# Patient Record
Sex: FEMALE | Race: WHITE | Employment: UNEMPLOYED | URBAN - METROPOLITAN AREA
[De-identification: names, ages, dates, MRNs, and addresses within clinical notes are randomized per-mention and may not be internally consistent; named-entity substitution may affect disease eponyms.]

---

## 2023-12-06 ENCOUNTER — APPOINTMENT (OUTPATIENT)
Dept: GENERAL RADIOLOGY | Age: 33
End: 2023-12-06
Payer: MEDICAID

## 2023-12-06 ENCOUNTER — HOSPITAL ENCOUNTER (INPATIENT)
Age: 33
LOS: 2 days | Discharge: LEFT BEFORE DISCHARGE | End: 2023-12-08
Attending: EMERGENCY MEDICINE | Admitting: HOSPITALIST
Payer: MEDICAID

## 2023-12-06 ENCOUNTER — APPOINTMENT (OUTPATIENT)
Dept: CT IMAGING | Age: 33
End: 2023-12-06
Payer: MEDICAID

## 2023-12-06 ENCOUNTER — APPOINTMENT (OUTPATIENT)
Dept: CT IMAGING | Age: 33
End: 2023-12-06
Attending: EMERGENCY MEDICINE
Payer: MEDICAID

## 2023-12-06 DIAGNOSIS — S71.101A COMPLICATED OPEN WOUND OF RIGHT THIGH, INITIAL ENCOUNTER: Primary | ICD-10-CM

## 2023-12-06 DIAGNOSIS — L03.116 LEFT LEG CELLULITIS: ICD-10-CM

## 2023-12-06 PROBLEM — L03.90 CELLULITIS: Status: ACTIVE | Noted: 2023-12-06

## 2023-12-06 LAB
ALBUMIN SERPL-MCNC: 4 G/DL (ref 3.4–5)
ALBUMIN/GLOB SERPL: 1.3 {RATIO} (ref 1.1–2.2)
ALP SERPL-CCNC: 65 U/L (ref 40–129)
ALT SERPL-CCNC: 10 U/L (ref 10–40)
ANION GAP SERPL CALCULATED.3IONS-SCNC: 10 MMOL/L (ref 3–16)
AST SERPL-CCNC: 13 U/L (ref 15–37)
BASE EXCESS BLDV CALC-SCNC: 4.5 MMOL/L
BASOPHILS # BLD: 0 K/UL (ref 0–0.2)
BASOPHILS NFR BLD: 1 %
BILIRUB SERPL-MCNC: 0.4 MG/DL (ref 0–1)
BUN SERPL-MCNC: 12 MG/DL (ref 7–20)
CALCIUM SERPL-MCNC: 9.2 MG/DL (ref 8.3–10.6)
CHLORIDE SERPL-SCNC: 100 MMOL/L (ref 99–110)
CO2 BLDV-SCNC: 29 MMOL/L
CO2 SERPL-SCNC: 25 MMOL/L (ref 21–32)
COHGB MFR BLDV: 7.2 %
CREAT SERPL-MCNC: 0.7 MG/DL (ref 0.6–1.1)
CRP SERPL-MCNC: 19 MG/L (ref 0–5.1)
D DIMER: 1.87 UG/ML FEU (ref 0–0.6)
DEPRECATED RDW RBC AUTO: 14.1 % (ref 12.4–15.4)
EOSINOPHIL # BLD: 0.1 K/UL (ref 0–0.6)
EOSINOPHIL NFR BLD: 2.7 %
ERYTHROCYTE [SEDIMENTATION RATE] IN BLOOD BY WESTERGREN METHOD: 19 MM/HR (ref 0–20)
FLUAV RNA UPPER RESP QL NAA+PROBE: NEGATIVE
FLUBV AG NPH QL: NEGATIVE
GFR SERPLBLD CREATININE-BSD FMLA CKD-EPI: >60 ML/MIN/{1.73_M2}
GLUCOSE SERPL-MCNC: 85 MG/DL (ref 70–99)
HCG SERPL QL: NEGATIVE
HCO3 BLDV-SCNC: 28 MMOL/L (ref 23–29)
HCT VFR BLD AUTO: 34.3 % (ref 36–48)
HGB BLD-MCNC: 11.7 G/DL (ref 12–16)
LACTATE BLDV-SCNC: 1 MMOL/L (ref 0.4–1.9)
LYMPHOCYTES # BLD: 1.6 K/UL (ref 1–5.1)
LYMPHOCYTES NFR BLD: 32.7 %
MCH RBC QN AUTO: 27.7 PG (ref 26–34)
MCHC RBC AUTO-ENTMCNC: 34.3 G/DL (ref 31–36)
MCV RBC AUTO: 80.7 FL (ref 80–100)
METHGB MFR BLDV: 0.2 %
MONOCYTES # BLD: 0.7 K/UL (ref 0–1.3)
MONOCYTES NFR BLD: 14.7 %
NEUTROPHILS # BLD: 2.4 K/UL (ref 1.7–7.7)
NEUTROPHILS NFR BLD: 48.9 %
O2 THERAPY: ABNORMAL
PCO2 BLDV: 36.9 MMHG (ref 40–50)
PH BLDV: 7.49 [PH] (ref 7.35–7.45)
PLATELET # BLD AUTO: 290 K/UL (ref 135–450)
PMV BLD AUTO: 8 FL (ref 5–10.5)
PO2 BLDV: 37 MMHG
POTASSIUM SERPL-SCNC: 3.9 MMOL/L (ref 3.5–5.1)
PROT SERPL-MCNC: 7.1 G/DL (ref 6.4–8.2)
RBC # BLD AUTO: 4.25 M/UL (ref 4–5.2)
REASON FOR REJECTION: NORMAL
REJECTED TEST: NORMAL
SAO2 % BLDV: 78 %
SARS-COV-2 RDRP RESP QL NAA+PROBE: NOT DETECTED
SODIUM SERPL-SCNC: 135 MMOL/L (ref 136–145)
WBC # BLD AUTO: 4.9 K/UL (ref 4–11)

## 2023-12-06 PROCEDURE — 73700 CT LOWER EXTREMITY W/O DYE: CPT

## 2023-12-06 PROCEDURE — 96365 THER/PROPH/DIAG IV INF INIT: CPT

## 2023-12-06 PROCEDURE — 85025 COMPLETE CBC W/AUTO DIFF WBC: CPT

## 2023-12-06 PROCEDURE — 96367 TX/PROPH/DG ADDL SEQ IV INF: CPT

## 2023-12-06 PROCEDURE — C1751 CATH, INF, PER/CENT/MIDLINE: HCPCS

## 2023-12-06 PROCEDURE — 6360000002 HC RX W HCPCS: Performed by: EMERGENCY MEDICINE

## 2023-12-06 PROCEDURE — 73590 X-RAY EXAM OF LOWER LEG: CPT

## 2023-12-06 PROCEDURE — 84703 CHORIONIC GONADOTROPIN ASSAY: CPT

## 2023-12-06 PROCEDURE — 6370000000 HC RX 637 (ALT 250 FOR IP): Performed by: HOSPITALIST

## 2023-12-06 PROCEDURE — 2580000003 HC RX 258: Performed by: EMERGENCY MEDICINE

## 2023-12-06 PROCEDURE — 85379 FIBRIN DEGRADATION QUANT: CPT

## 2023-12-06 PROCEDURE — 2580000003 HC RX 258: Performed by: HOSPITALIST

## 2023-12-06 PROCEDURE — 80053 COMPREHEN METABOLIC PANEL: CPT

## 2023-12-06 PROCEDURE — 36569 INSJ PICC 5 YR+ W/O IMAGING: CPT

## 2023-12-06 PROCEDURE — 96374 THER/PROPH/DIAG INJ IV PUSH: CPT

## 2023-12-06 PROCEDURE — 6370000000 HC RX 637 (ALT 250 FOR IP): Performed by: EMERGENCY MEDICINE

## 2023-12-06 PROCEDURE — 87040 BLOOD CULTURE FOR BACTERIA: CPT

## 2023-12-06 PROCEDURE — 1200000000 HC SEMI PRIVATE

## 2023-12-06 PROCEDURE — 73552 X-RAY EXAM OF FEMUR 2/>: CPT

## 2023-12-06 PROCEDURE — 6360000002 HC RX W HCPCS: Performed by: HOSPITALIST

## 2023-12-06 PROCEDURE — 87804 INFLUENZA ASSAY W/OPTIC: CPT

## 2023-12-06 PROCEDURE — 2500000003 HC RX 250 WO HCPCS: Performed by: EMERGENCY MEDICINE

## 2023-12-06 PROCEDURE — 71045 X-RAY EXAM CHEST 1 VIEW: CPT

## 2023-12-06 PROCEDURE — 96375 TX/PRO/DX INJ NEW DRUG ADDON: CPT

## 2023-12-06 PROCEDURE — 87635 SARS-COV-2 COVID-19 AMP PRB: CPT

## 2023-12-06 PROCEDURE — 83605 ASSAY OF LACTIC ACID: CPT

## 2023-12-06 PROCEDURE — 86140 C-REACTIVE PROTEIN: CPT

## 2023-12-06 PROCEDURE — 02HV33Z INSERTION OF INFUSION DEVICE INTO SUPERIOR VENA CAVA, PERCUTANEOUS APPROACH: ICD-10-PCS | Performed by: HOSPITALIST

## 2023-12-06 PROCEDURE — 85652 RBC SED RATE AUTOMATED: CPT

## 2023-12-06 PROCEDURE — 96366 THER/PROPH/DIAG IV INF ADDON: CPT

## 2023-12-06 PROCEDURE — 99285 EMERGENCY DEPT VISIT HI MDM: CPT

## 2023-12-06 PROCEDURE — 82803 BLOOD GASES ANY COMBINATION: CPT

## 2023-12-06 PROCEDURE — 73630 X-RAY EXAM OF FOOT: CPT

## 2023-12-06 RX ORDER — POTASSIUM CHLORIDE 20 MEQ/1
40 TABLET, EXTENDED RELEASE ORAL PRN
Status: DISCONTINUED | OUTPATIENT
Start: 2023-12-06 | End: 2023-12-08 | Stop reason: HOSPADM

## 2023-12-06 RX ORDER — SODIUM CHLORIDE 9 MG/ML
INJECTION, SOLUTION INTRAVENOUS PRN
Status: DISCONTINUED | OUTPATIENT
Start: 2023-12-06 | End: 2023-12-08 | Stop reason: HOSPADM

## 2023-12-06 RX ORDER — SODIUM CHLORIDE 0.9 % (FLUSH) 0.9 %
5-40 SYRINGE (ML) INJECTION EVERY 12 HOURS SCHEDULED
Status: DISCONTINUED | OUTPATIENT
Start: 2023-12-06 | End: 2023-12-08 | Stop reason: HOSPADM

## 2023-12-06 RX ORDER — MORPHINE SULFATE 4 MG/ML
4 INJECTION, SOLUTION INTRAMUSCULAR; INTRAVENOUS ONCE
Status: COMPLETED | OUTPATIENT
Start: 2023-12-06 | End: 2023-12-06

## 2023-12-06 RX ORDER — ONDANSETRON 4 MG/1
4 TABLET, ORALLY DISINTEGRATING ORAL EVERY 8 HOURS PRN
Status: DISCONTINUED | OUTPATIENT
Start: 2023-12-06 | End: 2023-12-08 | Stop reason: HOSPADM

## 2023-12-06 RX ORDER — SODIUM CHLORIDE 0.9 % (FLUSH) 0.9 %
5-40 SYRINGE (ML) INJECTION PRN
Status: DISCONTINUED | OUTPATIENT
Start: 2023-12-06 | End: 2023-12-08 | Stop reason: HOSPADM

## 2023-12-06 RX ORDER — ACETAMINOPHEN 650 MG/1
650 SUPPOSITORY RECTAL EVERY 6 HOURS PRN
Status: DISCONTINUED | OUTPATIENT
Start: 2023-12-06 | End: 2023-12-08 | Stop reason: HOSPADM

## 2023-12-06 RX ORDER — KETOROLAC TROMETHAMINE 15 MG/ML
15 INJECTION, SOLUTION INTRAMUSCULAR; INTRAVENOUS ONCE
Status: COMPLETED | OUTPATIENT
Start: 2023-12-06 | End: 2023-12-06

## 2023-12-06 RX ORDER — ENOXAPARIN SODIUM 100 MG/ML
40 INJECTION SUBCUTANEOUS DAILY
Status: DISCONTINUED | OUTPATIENT
Start: 2023-12-07 | End: 2023-12-08 | Stop reason: HOSPADM

## 2023-12-06 RX ORDER — SODIUM CHLORIDE 9 MG/ML
INJECTION, SOLUTION INTRAVENOUS CONTINUOUS
Status: DISCONTINUED | OUTPATIENT
Start: 2023-12-06 | End: 2023-12-07

## 2023-12-06 RX ORDER — LORAZEPAM 1 MG/1
1 TABLET ORAL ONCE
Status: COMPLETED | OUTPATIENT
Start: 2023-12-06 | End: 2023-12-06

## 2023-12-06 RX ORDER — ONDANSETRON 2 MG/ML
4 INJECTION INTRAMUSCULAR; INTRAVENOUS EVERY 6 HOURS PRN
Status: DISCONTINUED | OUTPATIENT
Start: 2023-12-06 | End: 2023-12-08 | Stop reason: HOSPADM

## 2023-12-06 RX ORDER — POLYETHYLENE GLYCOL 3350 17 G/17G
17 POWDER, FOR SOLUTION ORAL DAILY PRN
Status: DISCONTINUED | OUTPATIENT
Start: 2023-12-06 | End: 2023-12-08 | Stop reason: HOSPADM

## 2023-12-06 RX ORDER — SODIUM CHLORIDE 9 MG/ML
25 INJECTION, SOLUTION INTRAVENOUS PRN
Status: DISCONTINUED | OUTPATIENT
Start: 2023-12-06 | End: 2023-12-06

## 2023-12-06 RX ORDER — LIDOCAINE HYDROCHLORIDE 10 MG/ML
5 INJECTION, SOLUTION EPIDURAL; INFILTRATION; INTRACAUDAL; PERINEURAL ONCE
Status: COMPLETED | OUTPATIENT
Start: 2023-12-06 | End: 2023-12-06

## 2023-12-06 RX ORDER — LORAZEPAM 2 MG/ML
1 INJECTION INTRAMUSCULAR ONCE
Status: COMPLETED | OUTPATIENT
Start: 2023-12-06 | End: 2023-12-06

## 2023-12-06 RX ORDER — VANCOMYCIN 2 G/400ML
20 INJECTION, SOLUTION INTRAVENOUS ONCE
Status: COMPLETED | OUTPATIENT
Start: 2023-12-06 | End: 2023-12-06

## 2023-12-06 RX ORDER — ACETAMINOPHEN 325 MG/1
650 TABLET ORAL EVERY 6 HOURS PRN
Status: DISCONTINUED | OUTPATIENT
Start: 2023-12-06 | End: 2023-12-08 | Stop reason: HOSPADM

## 2023-12-06 RX ORDER — SODIUM CHLORIDE 0.9 % (FLUSH) 0.9 %
10 SYRINGE (ML) INJECTION PRN
Status: DISCONTINUED | OUTPATIENT
Start: 2023-12-06 | End: 2023-12-08 | Stop reason: HOSPADM

## 2023-12-06 RX ORDER — POTASSIUM CHLORIDE 7.45 MG/ML
10 INJECTION INTRAVENOUS PRN
Status: DISCONTINUED | OUTPATIENT
Start: 2023-12-06 | End: 2023-12-08 | Stop reason: HOSPADM

## 2023-12-06 RX ORDER — OXYCODONE HYDROCHLORIDE AND ACETAMINOPHEN 5; 325 MG/1; MG/1
1 TABLET ORAL EVERY 4 HOURS PRN
Status: DISCONTINUED | OUTPATIENT
Start: 2023-12-06 | End: 2023-12-07

## 2023-12-06 RX ADMIN — Medication 10 ML: at 19:53

## 2023-12-06 RX ADMIN — SODIUM CHLORIDE: 9 INJECTION, SOLUTION INTRAVENOUS at 19:52

## 2023-12-06 RX ADMIN — LIDOCAINE HYDROCHLORIDE 5 ML: 10 INJECTION, SOLUTION EPIDURAL; INFILTRATION; INTRACAUDAL; PERINEURAL at 16:30

## 2023-12-06 RX ADMIN — KETOROLAC TROMETHAMINE 15 MG: 15 INJECTION, SOLUTION INTRAMUSCULAR; INTRAVENOUS at 06:45

## 2023-12-06 RX ADMIN — OXYCODONE AND ACETAMINOPHEN 1 TABLET: 5; 325 TABLET ORAL at 23:59

## 2023-12-06 RX ADMIN — Medication 10 ML: at 19:52

## 2023-12-06 RX ADMIN — MORPHINE SULFATE 4 MG: 4 INJECTION, SOLUTION INTRAMUSCULAR; INTRAVENOUS at 09:37

## 2023-12-06 RX ADMIN — LORAZEPAM 1 MG: 1 TABLET ORAL at 14:01

## 2023-12-06 RX ADMIN — VANCOMYCIN 2000 MG: 2 INJECTION, SOLUTION INTRAVENOUS at 08:26

## 2023-12-06 RX ADMIN — LORAZEPAM 1 MG: 1 TABLET ORAL at 11:36

## 2023-12-06 RX ADMIN — Medication 1 MG: at 08:21

## 2023-12-06 RX ADMIN — OXYCODONE AND ACETAMINOPHEN 1 TABLET: 5; 325 TABLET ORAL at 13:04

## 2023-12-06 RX ADMIN — VANCOMYCIN HYDROCHLORIDE 1750 MG: 1 INJECTION, POWDER, LYOPHILIZED, FOR SOLUTION INTRAVENOUS at 23:53

## 2023-12-06 RX ADMIN — CEFEPIME 2000 MG: 2 INJECTION, POWDER, FOR SOLUTION INTRAVENOUS at 07:22

## 2023-12-06 RX ADMIN — OXYCODONE HYDROCHLORIDE 15 MG: 10 TABLET ORAL at 21:06

## 2023-12-06 ASSESSMENT — PAIN DESCRIPTION - LOCATION
LOCATION: LEG
LOCATION: LEG

## 2023-12-06 ASSESSMENT — PAIN - FUNCTIONAL ASSESSMENT: PAIN_FUNCTIONAL_ASSESSMENT: 0-10

## 2023-12-06 ASSESSMENT — PAIN DESCRIPTION - ORIENTATION
ORIENTATION: LEFT
ORIENTATION: LEFT;RIGHT

## 2023-12-06 ASSESSMENT — PAIN SCALES - GENERAL
PAINLEVEL_OUTOF10: 7
PAINLEVEL_OUTOF10: 7
PAINLEVEL_OUTOF10: 8

## 2023-12-06 ASSESSMENT — PAIN DESCRIPTION - DESCRIPTORS
DESCRIPTORS: ACHING
DESCRIPTORS: ACHING

## 2023-12-06 NOTE — PROGRESS NOTES
Patient very anxious at this time. VSS, but will not keep blood pressure cuff and pulse ox on. Dry gauze dressing placed over patients thigh abscess.

## 2023-12-06 NOTE — ED TRIAGE NOTES
Pt presents for abscesses secondary to injecting opiates. Pt is very tearful and expresses she has medical trauma.

## 2023-12-06 NOTE — PROGRESS NOTES
Call from PICC rn. They are walking into hospital at this time. One time ativan given to patient prior to procedure.

## 2023-12-06 NOTE — ED PROVIDER NOTES
[facility]    This patient was initially evaluated by Dr Makayla Cabrera. Briefly, 34 y.o. female with PMH lupus, IVDU presented with concern for soft tissue infection LLE. At the time of signout, the following was pending:    - f/u labs, CT, disposition      On my reassessment, the patient is anxious in appearance. She has multiple skin lesions - most notably anterior aspect LLL with central focus of necrosis, surrounding erythema, no crepitus, no fluctuance, no lymphangitic streaking. CBC no leukocytosis. CRP elevated. Already rec'd broad spectrum abx. Lactate WNL. IV access on this patient had proven difficult prior to my arrival.  She had an EJ placed by the off going physician however was noted to have extravasated. I evaluated the patient for an ultrasound-guided peripheral IV. She is very tortuous, deep vessels. I did offer to place a peripheral IJ however the patient is refusing. I offered other central venous access, the patient continues to refuse. She does wish for me to proceed with placement of an ultrasound-guided peripheral IV. A 20 gauge long PIV was placed in the right upper extremity as documented below without complication. The patient was very hesitant for admission to the hospital.  She has a history of PTSD and multiple hospitalizations. She states she has been traumatized by the experiences she has had previously. She does, however, ultimately consents to be admitted to the hospital.  Case d/w Dr Christi Prieto. Admit to . D-dimer pending at time of admission -- lab indicates issues with the D-dimer assay. Hospitalist aware of pending lab. Lore Neff MD, am the primary clinician of record. PROCEDURES  Ultrasound guided peripheral IV  Indication: difficult IV access  Verbal consent given by the patient.  Risks discussed including but not limited to failure to achieve the desired result, need for additional procedure, damage to surrounding

## 2023-12-06 NOTE — PROGRESS NOTES
Patient in bed, VSS on room air. Patient asleep at this time, but easily arouses. Denies any needs at this time.

## 2023-12-06 NOTE — PROGRESS NOTES
Arrived to place PICC line with bedside RN Valerio Archer. Pre-procedure and timeout done with RN, discussed limitations of placement and allergies. Pt's basilic, brachial, cephalic are all easily collapsible with no indication for a clot. Vein selected is large enough for catheter. Pt tolerated sterile procedure well, with no difficulty accessing basilic vein, when accessed - blood was free flowing and non-pulsatile. Guidewire, introducer, and catheter went in smoothly. PICC line verified with 3CG technology with peaked P-waves (please see image below). PICC tip terminates in the SVC according to [employer] 3CG tip confirmation system. PICC was seen dropping into SVC with tip tracking technology and discernable peaked p waves were noted without negative deflection. OK to use PICC. Please use new IV tubing when connecting to the newly placed central line. Post procedure - reorganized pt table, placed pt in lowest position, with call light and educated on line care. Reported off to bedside RN. Please call if you have any questions about the PICC or ML. The  will direct you to the PICC RN that is on call.       [phone]

## 2023-12-06 NOTE — ED PROVIDER NOTES
[address] [facility] EMERGENCY DEPARTMENT    CHIEF COMPLAINT  Abscess (Abscesses from IV drug use )       HISTORY OF PRESENT ILLNESS  Carlton Howard is a 34 y.o. female w/ hx of lupus, IVDU who presents to the ED with multiple abscesses. She has an abscess on her right thigh that been present for about 1 month. It has been spontaneously draining purulent fluid and she has an open wound there. Has multiple small abscesses on her left shin and was concerned that she got bit by a spider today but did not see the spider. She developed a rapidly spreading redness and swelling of her right foot and leg today. States she has chronic fevers from her lupus. Is not currently on any immune suppression. States she has had cough and congestion for several days along with several family members and that her wife has not tested positive for COVID. Denies chest pain or shortness of breath. I have reviewed the following from the nursing documentation:    Past Medical History:   Diagnosis Date    Blindness of right eye     Lupus ([address])     Sacrum and coccyx fracture ([address])     Seizure ([address]) 05/08/2021    secondary to TBI    TBI (traumatic brain injury) [facility])      Past Surgical History:   Procedure Laterality Date    EXPLORATORY LAPAROTOMY W/ BOWEL RESECTION      EYE SURGERY      \"I've had like 18 surgeries on my eye\"    LUMBAR DISCECTOMY      PANCREAS SURGERY      \"something with my pancreas\"     History reviewed. No pertinent family history.   Social History     Socioeconomic History    Marital status:      Spouse name: Not on file    Number of children: Not on file    Years of education: Not on file    Highest education level: Not on file   Occupational History    Not on file   Tobacco Use    Smoking status: Every Day     Packs/day: 1.00     Years: 20.00     Additional pack years: 0.00     Total pack years: 20.00     Types: Cigarettes    Smokeless tobacco: Never   Substance and Sexual Activity    Alcohol use: marks on all 4 extremities  Neurological: Alert and oriented. CN II-XII intact. Strength 5/5 bilateral upper and lower extremities. Sensation intact to light touch. Psych: Anxious    LABS  I have reviewed all labs for this visit. Results for orders placed or performed during the hospital encounter of 12/06/23   CBC with Auto Differential   Result Value Ref Range    WBC 4.9 4.0 - 11.0 K/uL    RBC 4.25 4.00 - 5.20 M/uL    Hemoglobin 11.7 (L) 12.0 - 16.0 g/dL    Hematocrit 34.3 (L) 36.0 - 48.0 %    MCV 80.7 80.0 - 100.0 fL    MCH 27.7 26.0 - 34.0 pg    MCHC 34.3 31.0 - 36.0 g/dL    RDW 14.1 12.4 - 15.4 %    Platelets 832 822 - 331 K/uL    MPV 8.0 5.0 - 10.5 fL    Neutrophils % 48.9 %    Lymphocytes % 32.7 %    Monocytes % 14.7 %    Eosinophils % 2.7 %    Basophils % 1.0 %    Neutrophils Absolute 2.4 1.7 - 7.7 K/uL    Lymphocytes Absolute 1.6 1.0 - 5.1 K/uL    Monocytes Absolute 0.7 0.0 - 1.3 K/uL    Eosinophils Absolute 0.1 0.0 - 0.6 K/uL    Basophils Absolute 0.0 0.0 - 0.2 K/uL         RADIOLOGY  I have reviewed all radiographic studies for this visit. XR FOOT LEFT (MIN 3 VIEWS)   Final Result   Soft tissue swelling, though without acute osseous fracture. No radiopaque foreign body or definite soft tissue gas. XR TIBIA FIBULA LEFT (2 VIEWS)   Final Result   Right femur:      1. No acute osseous abnormality. 2. Soft tissue swelling seen in the thigh. 3. No radiopaque foreign body. ------------------      Left tib-fib:      1. Small degree of soft tissue gas seen in the lateral aspect of the mid calf. 2. No foreign body. 3. No acute osseous abnormality. XR FEMUR RIGHT (MIN 2 VIEWS)   Final Result   Right femur:      1. No acute osseous abnormality. 2. Soft tissue swelling seen in the thigh. 3. No radiopaque foreign body. ------------------      Left tib-fib:      1. Small degree of soft tissue gas seen in the lateral aspect of the mid calf. 2. No foreign body.    3. No acute

## 2023-12-06 NOTE — PROGRESS NOTES
Patient asking for pain medication. Patient has PRN percocet q4h, but is not yet due for another dose. Dr. Carlos Manuel Santo regarding patients request for another pain medication. See new orders placed.

## 2023-12-07 LAB
BASOPHILS # BLD: 0 K/UL (ref 0–0.2)
BASOPHILS NFR BLD: 0.9 %
DEPRECATED RDW RBC AUTO: 14.2 % (ref 12.4–15.4)
EOSINOPHIL # BLD: 0.3 K/UL (ref 0–0.6)
EOSINOPHIL NFR BLD: 4.9 %
HCT VFR BLD AUTO: 32.1 % (ref 36–48)
HGB BLD-MCNC: 10.9 G/DL (ref 12–16)
LYMPHOCYTES # BLD: 1.7 K/UL (ref 1–5.1)
LYMPHOCYTES NFR BLD: 32.6 %
MCH RBC QN AUTO: 27.1 PG (ref 26–34)
MCHC RBC AUTO-ENTMCNC: 34.1 G/DL (ref 31–36)
MCV RBC AUTO: 79.5 FL (ref 80–100)
MONOCYTES # BLD: 0.8 K/UL (ref 0–1.3)
MONOCYTES NFR BLD: 14.5 %
MRSA DNA SPEC QL NAA+PROBE: NORMAL
NEUTROPHILS # BLD: 2.5 K/UL (ref 1.7–7.7)
NEUTROPHILS NFR BLD: 47.1 %
PLATELET # BLD AUTO: 260 K/UL (ref 135–450)
PMV BLD AUTO: 7.8 FL (ref 5–10.5)
PROCALCITONIN SERPL IA-MCNC: 0.09 NG/ML (ref 0–0.15)
RBC # BLD AUTO: 4.03 M/UL (ref 4–5.2)
VANCOMYCIN SERPL-MCNC: 19.8 UG/ML
WBC # BLD AUTO: 5.3 K/UL (ref 4–11)

## 2023-12-07 PROCEDURE — 2580000003 HC RX 258: Performed by: HOSPITALIST

## 2023-12-07 PROCEDURE — 6360000002 HC RX W HCPCS: Performed by: HOSPITALIST

## 2023-12-07 PROCEDURE — 6370000000 HC RX 637 (ALT 250 FOR IP): Performed by: HOSPITALIST

## 2023-12-07 PROCEDURE — 84145 PROCALCITONIN (PCT): CPT

## 2023-12-07 PROCEDURE — 85025 COMPLETE CBC W/AUTO DIFF WBC: CPT

## 2023-12-07 PROCEDURE — 2580000003 HC RX 258: Performed by: INTERNAL MEDICINE

## 2023-12-07 PROCEDURE — 1200000000 HC SEMI PRIVATE

## 2023-12-07 PROCEDURE — 87641 MR-STAPH DNA AMP PROBE: CPT

## 2023-12-07 PROCEDURE — 6360000002 HC RX W HCPCS: Performed by: INTERNAL MEDICINE

## 2023-12-07 PROCEDURE — 80202 ASSAY OF VANCOMYCIN: CPT

## 2023-12-07 RX ORDER — SODIUM CHLORIDE 9 MG/ML
INJECTION, SOLUTION INTRAVENOUS CONTINUOUS
Status: ACTIVE | OUTPATIENT
Start: 2023-12-07 | End: 2023-12-07

## 2023-12-07 RX ORDER — OXYCODONE HYDROCHLORIDE AND ACETAMINOPHEN 5; 325 MG/1; MG/1
1 TABLET ORAL EVERY 4 HOURS PRN
Status: DISCONTINUED | OUTPATIENT
Start: 2023-12-07 | End: 2023-12-08 | Stop reason: HOSPADM

## 2023-12-07 RX ORDER — HEPARIN SODIUM (PORCINE) LOCK FLUSH IV SOLN 100 UNIT/ML 100 UNIT/ML
100 SOLUTION INTRAVENOUS PRN
Status: DISCONTINUED | OUTPATIENT
Start: 2023-12-07 | End: 2023-12-07

## 2023-12-07 RX ORDER — SODIUM CHLORIDE 0.9 % (FLUSH) 0.9 %
5-40 SYRINGE (ML) INJECTION EVERY 6 HOURS PRN
Status: DISCONTINUED | OUTPATIENT
Start: 2023-12-07 | End: 2023-12-08 | Stop reason: HOSPADM

## 2023-12-07 RX ORDER — HEPARIN SODIUM (PORCINE) LOCK FLUSH IV SOLN 100 UNIT/ML 100 UNIT/ML
100 SOLUTION INTRAVENOUS 2 TIMES DAILY
Status: DISCONTINUED | OUTPATIENT
Start: 2023-12-07 | End: 2023-12-08 | Stop reason: HOSPADM

## 2023-12-07 RX ADMIN — OXYCODONE HYDROCHLORIDE 15 MG: 10 TABLET ORAL at 17:25

## 2023-12-07 RX ADMIN — CEFEPIME 2000 MG: 2 INJECTION, POWDER, FOR SOLUTION INTRAVENOUS at 21:38

## 2023-12-07 RX ADMIN — Medication 10 ML: at 08:47

## 2023-12-07 RX ADMIN — OXYCODONE HYDROCHLORIDE 15 MG: 10 TABLET ORAL at 05:48

## 2023-12-07 RX ADMIN — OXYCODONE HYDROCHLORIDE 15 MG: 10 TABLET ORAL at 21:38

## 2023-12-07 RX ADMIN — CEFEPIME 2000 MG: 2 INJECTION, POWDER, FOR SOLUTION INTRAVENOUS at 08:47

## 2023-12-07 RX ADMIN — OXYCODONE HYDROCHLORIDE 15 MG: 10 TABLET ORAL at 12:29

## 2023-12-07 RX ADMIN — Medication 100 UNITS: at 13:27

## 2023-12-07 RX ADMIN — VANCOMYCIN HYDROCHLORIDE 1750 MG: 1 INJECTION, POWDER, LYOPHILIZED, FOR SOLUTION INTRAVENOUS at 13:31

## 2023-12-07 RX ADMIN — ONDANSETRON 4 MG: 2 INJECTION INTRAMUSCULAR; INTRAVENOUS at 12:40

## 2023-12-07 RX ADMIN — SODIUM CHLORIDE: 9 INJECTION, SOLUTION INTRAVENOUS at 15:32

## 2023-12-07 ASSESSMENT — PAIN DESCRIPTION - DESCRIPTORS
DESCRIPTORS: ACHING

## 2023-12-07 ASSESSMENT — PAIN SCALES - GENERAL
PAINLEVEL_OUTOF10: 10
PAINLEVEL_OUTOF10: 8
PAINLEVEL_OUTOF10: 10
PAINLEVEL_OUTOF10: 9

## 2023-12-07 ASSESSMENT — PAIN DESCRIPTION - PAIN TYPE
TYPE: ACUTE PAIN
TYPE: ACUTE PAIN

## 2023-12-07 ASSESSMENT — PAIN DESCRIPTION - LOCATION
LOCATION: GENERALIZED
LOCATION: LEG
LOCATION: LEG
LOCATION: GENERALIZED

## 2023-12-07 ASSESSMENT — PAIN DESCRIPTION - FREQUENCY
FREQUENCY: CONTINUOUS
FREQUENCY: CONTINUOUS

## 2023-12-07 ASSESSMENT — PAIN DESCRIPTION - ONSET
ONSET: ON-GOING
ONSET: ON-GOING

## 2023-12-07 ASSESSMENT — PAIN DESCRIPTION - ORIENTATION
ORIENTATION: LEFT;RIGHT
ORIENTATION: LEFT;RIGHT

## 2023-12-07 ASSESSMENT — PAIN - FUNCTIONAL ASSESSMENT: PAIN_FUNCTIONAL_ASSESSMENT: ACTIVITIES ARE NOT PREVENTED

## 2023-12-07 NOTE — PROGRESS NOTES
Physical Therapy  PT courtney Olmedo    Received orders for PT eval and treat. Spoke with pt and she denies any PT needs. Reports she has been getting up to bathroom without assist.  Will sign off.     Electronically signed by Tucker Starkey, PT [id number] on 12/7/2023 at 8:48 AM

## 2023-12-07 NOTE — PROGRESS NOTES
Occupational Therapy  No Eval and Discharge    Sharlene Jimmy  12/7/2023    OT orders received and chart reviewed. OT attempted to see for eval/tx. Pt reports she is functioning at baseline, and denies need for OT services. Pt reports UAL in room completing ADLs and fxl mobility independently. No acute OT services indicated, will discharge.      Chilango Clarke, OTR/L [id number]

## 2023-12-07 NOTE — PROGRESS NOTES
4 Eyes Skin Assessment     NAME:  Diaz Gonzalez OF BIRTH:  1990  MEDICAL RECORD NUMBER:  [id number]    The patient is being assessed for  Admission    I agree that at least one RN has performed a thorough Head to Toe Skin Assessment on the patient. ALL assessment sites listed below have been assessed. Areas assessed by both nurses:    Head, Face, Ears, Shoulders, Back, Chest, Arms, Elbows, Hands, Sacrum. Buttock, Coccyx, Ischium, Legs. Feet and Heels, and Under Medical Devices         Does the Patient have a Wound?  yes       Denzel Prevention initiated by RN: No  Wound Care Orders initiated by RN: No    Pressure Injury (Stage 3,4, Unstageable, DTI, NWPT, and Complex wounds) if present, place Wound referral order by RN under : No    New Ostomies, if present place, Ostomy referral order under : No     Nurse 1 eSignature: Electronically signed by Mariela Faria RN on 12/7/23 at 4:23 AM EST    **SHARE this note so that the co-signing nurse can place an eSignature**    Nurse 2 eSignature: Electronically signed by Himanshu King RN on 12/7/23 at 4:33 AM EST

## 2023-12-07 NOTE — PROGRESS NOTES
Pt transfer from the ED to 4118. A&oX4. VSS. Open wound noted on right thigh. Wound clean and dressing change. Patient c/o of pain. PRN pain medication given. Patient oriented to room. All questions answered.  Bed in the lowest position and call light within reach

## 2023-12-07 NOTE — PLAN OF CARE
Problem: Safety - Adult  Goal: Free from fall injury  12/7/2023 1711 by Boni Flowers RN  Outcome: Progressing  12/7/2023 0353 by Irma Rayo RN  Outcome: Progressing     Problem: Discharge Planning  Goal: Discharge to home or other facility with appropriate resources  12/7/2023 1711 by Boni Flowers RN  Outcome: Progressing  12/7/2023 0353 by Irma Rayo RN  Outcome: Progressing     Problem: Pain  Goal: Verbalizes/displays adequate comfort level or baseline comfort level  12/7/2023 1711 by Boni Flowers RN  Outcome: Progressing  12/7/2023 0353 by Irma Rayo RN  Outcome: Progressing     Problem: Skin/Tissue Integrity - Adult  Goal: Incisions, wounds, or drain sites healing without S/S of infection  12/7/2023 1711 by Boni Flowers RN  Outcome: Progressing  12/7/2023 0353 by Irma Rayo RN  Outcome: Progressing     Problem: Infection - Adult  Goal: Absence of infection at discharge  12/7/2023 1711 by Boni Flowers RN  Outcome: Progressing  12/7/2023 0353 by Irma Rayo RN  Outcome: Progressing  Goal: Absence of infection during hospitalization  12/7/2023 1711 by Boni Flowers RN  Outcome: Progressing  12/7/2023 0353 by Irma Rayo RN  Outcome: Progressing

## 2023-12-07 NOTE — CARE COORDINATION
the discharge plan with any other family members/significant others, and if so, who? (P) Yes (Can talk with spouse as needed.)  Plans to Return to Present Housing: (P) Yes  Other Identified Issues/Barriers to RETURNING to current housing: Pt would not be eligible for IV abx at home d/t IV drug abuse. Would need SNF. Potential Assistance needed at discharge: (P) N/A (Pt is refusing all assistance at this time. SW talked with pt about prescription assistance as she has [place]. Pt declined needing asssitance.)            Potential DME:  None  Patient expects to discharge to: (P) Apartment  Plan for transportation at discharge: (P) Family (Spouse will transport pt home at d/c.)    Financial    Payor: [address] / Plan: [email or web address] / Product Type: *No Product type* /     Does insurance require precert for SNF: Yes    Potential assistance Purchasing Medications: (P) No (Pt declined all assistance at this time.)  Meds-to-Beds request: Yes    No Pharmacies Listed    Notes:    Factors facilitating achievement of predicted outcomes: Motivated, Pleasant, and Good insight into deficits    Barriers to discharge: Pain, Limited family support, Anxiety, and history of IV drug abuse. Additional Case Management Notes: SW met with pt and pts spouse to discuss d/c plans. Pt resides in an apartment with her spouse and stated she had previously been going to a pain clinic when the clinic was shut down. Pt stated she is now addicted to opiates and uses intravenously. Pt stated she plans to f/u with [facility] with the hope to start on Methadone. Pt stated she does not want assistance with this process. Pt does not have a PCP and declined a PCP list stating she will take care of finding a PCP. SHARRON talked with pt about possible medication assistance as she has [place]. Pt stated she has applied for [facility] and does not need help with affording her medications.   SHARRON requested pt to reach out if she should change her mind and would be interested in resources or connections to support in the community. Pts spouse will transport pt home at d/c. PLAN: From home with wife. Hx of IV drug abuse. Declined resources or support. No PCP. Pt declined assistance with PCP arrangement or a PCP list.  Pt has [place] Medicaid and declined assistance with medications. Spouse will transport pt home. The Plan for Transition of Care is related to the following treatment goals of Cellulitis [L03.90]  Left leg cellulitis [O58.474]  Complicated open wound of right thigh, initial encounter [address]  Case Management Department  Ph: [phone] Fax: [phone]  Electronically signed by DALIA Marmolejo on 12/7/2023 at 3:57 PM       12/07/23 3429   Service Assessment   Patient Orientation Alert and Oriented;Person;Place;Situation;Self   Cognition Alert   History Provided By Patient   Primary [address] is: Legal Next of Kin   PCP Verified by CM No  (Pt stated she has no PCP but will take care of it. Declined need for PCP list.)   Prior Functional Level Independent in ADLs/IADLs   Current Functional Level Independent in ADLs/IADLs   Can patient return to prior living arrangement Yes   Ability to make needs known: Fair   Family able to assist with home care needs: Yes   Would you like for me to discuss the discharge plan with any other family members/significant others, and if so, who? Yes  (Can talk with spouse as needed.)   Financial Resources Medicaid  (Has applied for [facility])   [employer] None   CM/SW Referral Other (see comment)  (d/c planning needs)   Discharge Planning   Type of Residence Apartment   Living Arrangements Spouse/Significant Other   Current Services Prior To Admission None   Potential Assistance Needed N/A  (Pt is refusing all assistance at this time.

## 2023-12-07 NOTE — PROGRESS NOTES
Report called to 4th floor RN to resume care. Updated on patients chief complaint, reason for admission, events while being in ED, medications given and current vitals. All questions answered.

## 2023-12-07 NOTE — PLAN OF CARE
Problem: Safety - Adult  Goal: Free from fall injury  Outcome: Progressing     Problem: Discharge Planning  Goal: Discharge to home or other facility with appropriate resources  Outcome: Progressing     Problem: Pain  Goal: Verbalizes/displays adequate comfort level or baseline comfort level  Outcome: Progressing     Problem: Skin/Tissue Integrity - Adult  Goal: Incisions, wounds, or drain sites healing without S/S of infection  Outcome: Progressing     Problem: Infection - Adult  Goal: Absence of infection at discharge  Outcome: Progressing  Goal: Absence of infection during hospitalization  Outcome: Progressing

## 2023-12-08 VITALS
SYSTOLIC BLOOD PRESSURE: 128 MMHG | HEIGHT: 71 IN | BODY MASS INDEX: 35 KG/M2 | RESPIRATION RATE: 16 BRPM | TEMPERATURE: 98.6 F | DIASTOLIC BLOOD PRESSURE: 78 MMHG | HEART RATE: 93 BPM | WEIGHT: 250 LBS | OXYGEN SATURATION: 97 %

## 2023-12-08 LAB
ANION GAP SERPL CALCULATED.3IONS-SCNC: 6 MMOL/L (ref 3–16)
BUN SERPL-MCNC: 7 MG/DL (ref 7–20)
CALCIUM SERPL-MCNC: 8.1 MG/DL (ref 8.3–10.6)
CHLORIDE SERPL-SCNC: 109 MMOL/L (ref 99–110)
CO2 SERPL-SCNC: 25 MMOL/L (ref 21–32)
CREAT SERPL-MCNC: 0.6 MG/DL (ref 0.6–1.1)
CREAT SERPL-MCNC: 0.6 MG/DL (ref 0.6–1.1)
GFR SERPLBLD CREATININE-BSD FMLA CKD-EPI: >60 ML/MIN/{1.73_M2}
GFR SERPLBLD CREATININE-BSD FMLA CKD-EPI: >60 ML/MIN/{1.73_M2}
GLUCOSE SERPL-MCNC: 106 MG/DL (ref 70–99)
POTASSIUM SERPL-SCNC: 3.6 MMOL/L (ref 3.5–5.1)
SODIUM SERPL-SCNC: 140 MMOL/L (ref 136–145)
VANCOMYCIN TROUGH SERPL-MCNC: 30.6 UG/ML (ref 10–20)

## 2023-12-08 PROCEDURE — 6360000002 HC RX W HCPCS: Performed by: HOSPITALIST

## 2023-12-08 PROCEDURE — 6360000002 HC RX W HCPCS: Performed by: INTERNAL MEDICINE

## 2023-12-08 PROCEDURE — 2580000003 HC RX 258: Performed by: INTERNAL MEDICINE

## 2023-12-08 PROCEDURE — 2580000003 HC RX 258: Performed by: EMERGENCY MEDICINE

## 2023-12-08 PROCEDURE — 6370000000 HC RX 637 (ALT 250 FOR IP): Performed by: HOSPITALIST

## 2023-12-08 PROCEDURE — 80202 ASSAY OF VANCOMYCIN: CPT

## 2023-12-08 PROCEDURE — 82565 ASSAY OF CREATININE: CPT

## 2023-12-08 PROCEDURE — 2580000003 HC RX 258: Performed by: HOSPITALIST

## 2023-12-08 PROCEDURE — 80048 BASIC METABOLIC PNL TOTAL CA: CPT

## 2023-12-08 PROCEDURE — 94760 N-INVAS EAR/PLS OXIMETRY 1: CPT

## 2023-12-08 RX ADMIN — Medication 10 ML: at 09:31

## 2023-12-08 RX ADMIN — VANCOMYCIN HYDROCHLORIDE 1500 MG: 1.5 INJECTION, POWDER, LYOPHILIZED, FOR SOLUTION INTRAVENOUS at 01:08

## 2023-12-08 RX ADMIN — OXYCODONE AND ACETAMINOPHEN 1 TABLET: 5; 325 TABLET ORAL at 01:12

## 2023-12-08 RX ADMIN — CEFEPIME 2000 MG: 2 INJECTION, POWDER, FOR SOLUTION INTRAVENOUS at 09:34

## 2023-12-08 RX ADMIN — OXYCODONE HYDROCHLORIDE 15 MG: 10 TABLET ORAL at 06:42

## 2023-12-08 RX ADMIN — OXYCODONE HYDROCHLORIDE 15 MG: 10 TABLET ORAL at 16:15

## 2023-12-08 RX ADMIN — Medication 100 UNITS: at 09:31

## 2023-12-08 RX ADMIN — VANCOMYCIN HYDROCHLORIDE 1500 MG: 1.5 INJECTION, POWDER, LYOPHILIZED, FOR SOLUTION INTRAVENOUS at 14:23

## 2023-12-08 RX ADMIN — OXYCODONE HYDROCHLORIDE 15 MG: 10 TABLET ORAL at 11:20

## 2023-12-08 ASSESSMENT — PAIN SCALES - GENERAL
PAINLEVEL_OUTOF10: 0
PAINLEVEL_OUTOF10: 6
PAINLEVEL_OUTOF10: 7
PAINLEVEL_OUTOF10: 9
PAINLEVEL_OUTOF10: 9
PAINLEVEL_OUTOF10: 4
PAINLEVEL_OUTOF10: 8

## 2023-12-08 ASSESSMENT — PAIN DESCRIPTION - DESCRIPTORS
DESCRIPTORS: ACHING

## 2023-12-08 ASSESSMENT — PAIN DESCRIPTION - ORIENTATION
ORIENTATION: LEFT;RIGHT
ORIENTATION: RIGHT
ORIENTATION: LEFT;RIGHT
ORIENTATION: RIGHT

## 2023-12-08 ASSESSMENT — PAIN - FUNCTIONAL ASSESSMENT
PAIN_FUNCTIONAL_ASSESSMENT: PREVENTS OR INTERFERES SOME ACTIVE ACTIVITIES AND ADLS
PAIN_FUNCTIONAL_ASSESSMENT: PREVENTS OR INTERFERES SOME ACTIVE ACTIVITIES AND ADLS

## 2023-12-08 ASSESSMENT — PAIN DESCRIPTION - LOCATION
LOCATION: LEG

## 2023-12-08 NOTE — CARE COORDINATION
Wound care consulted for R thigh abscess. Met with patient. Pt states her wound is find and she is taking care of it. She buys supplies from [address] Pt refused to remove dressing. Based on patients demeanor I did not push this issue to remove dressing, however I offered alternatives for dressing changes. Pt did allow me to palpate periwound skin. No induration or periwound redness noted. Pt with visible scattered injection sites, high probability that wound from IVDU. Pt was provided supplies including adhesive remover, vashe, silicone dressings, and wound gel. Verbal instructions were provided including dressing changes, risk for infection, and wound care. Will not continue to follow. No further wound care needs at this time.   Electronically signed by Greg Laurent RN [id number] [facility] on 12/8/2023 at 1:17 PM

## 2023-12-08 NOTE — PROGRESS NOTES
Pt refused perc PRN after asking for pain medicaton. Pt states she would rather have robbie due to it being 15mg. Perc wasted with vanessa PALMER.     Electronically signed by Jewell Bamberger, RN on 12/8/2023 at 9:40 AM

## 2023-12-08 NOTE — PROGRESS NOTES
iterative reconstruction, and/or weight based adjustment of the mA/kV was utilized to reduce the radiation dose to as low as reasonably achievable.; CT of the left tibia and fibula was performed without the administration of intravenous contrast.  Multiplanar reformatted images are provided for review. Automated exposure control, iterative reconstruction, and/or weight based adjustment of the mA/kV was utilized to reduce the radiation dose to as low as reasonably achievable.; CT of the left femur was performed without the administration of intravenous contrast.  Multiplanar reformatted images are provided for review. Automated exposure control, iterative reconstruction, and/or weight based adjustment of the mA/kV was utilized to reduce the radiation dose to as low as reasonably achievable. COMPARISON: Radiographs 12/06/2023 HISTORY ORDERING SYSTEM PROVIDED HISTORY: ? gas on XR, redness, swelling, pain out of proportion, eval for deep space infection TECHNOLOGIST PROVIDED HISTORY: Reason for exam:->? gas on XR, redness, swelling, pain out of proportion, eval for deep space infection Decision Support Exception - unselect if not a suspected or confirmed emergency medical condition->Emergency Medical Condition (MA) Reason for Exam: gas on XR, redness, swelling, pain out of proportion, eval for deep space infection FINDINGS: There is generalized subcutaneous edema below the knee to the foot. There is patchy subcutaneous edema in the thigh. No well-defined drainable abscess is seen. There is a small soft tissue ulcer at the lateral aspect of the mid calf, likely correlating to the previously described x-ray finding. No evidence of soft tissue gas. No acute fracture is seen. No evidence of dislocation. No obvious bony erosions are seen. The appearance of soft tissue gas on radiographs corresponds to a small soft tissue ulcer on CT. No evidence of soft tissue gas.  Generalized subcutaneous edema predominantly from swelling seen within the right thigh. Left tib-fib a small degree of soft tissue gas is seen within the lateral aspect of the mid calf. Soft tissue edema is identified. Osseous structures at the knee, ankle as well as the tibia and fibula appear intact. No radiopaque foreign body is identified. Right femur: 1. No acute osseous abnormality. 2. Soft tissue swelling seen in the thigh. 3. No radiopaque foreign body. ------------------ Left tib-fib: 1. Small degree of soft tissue gas seen in the lateral aspect of the mid calf. 2. No foreign body. 3. No acute osseous abnormality. XR TIBIA FIBULA LEFT (2 VIEWS)    Result Date: 12/6/2023  EXAMINATION: 2 XRAY VIEWS OF THE LEFT TIBIA AND FIBULA; 2 XRAY VIEWS OF THE RIGHT FEMUR 12/6/2023 5:34 am COMPARISON: None. HISTORY: ORDERING SYSTEM PROVIDED HISTORY: pain/swelling, multiple wounds TECHNOLOGIST PROVIDED HISTORY: Reason for exam:->pain/swelling, multiple wounds Reason for Exam: pain swelling multiple wounds; ORDERING SYSTEM PROVIDED HISTORY: pain/swelling/wound TECHNOLOGIST PROVIDED HISTORY: Reason for exam:->pain/swelling/wound Reason for Exam: pain swelling abscess rt lower femur FINDINGS: Right femur: No fracture or dislocation is identified. The femur appears well seated within the right hip joint. No radiopaque foreign body seen adjacent to the femur or within the thigh. Soft tissue swelling seen within the right thigh. Left tib-fib a small degree of soft tissue gas is seen within the lateral aspect of the mid calf. Soft tissue edema is identified. Osseous structures at the knee, ankle as well as the tibia and fibula appear intact. No radiopaque foreign body is identified. Right femur: 1. No acute osseous abnormality. 2. Soft tissue swelling seen in the thigh. 3. No radiopaque foreign body. ------------------ Left tib-fib: 1. Small degree of soft tissue gas seen in the lateral aspect of the mid calf. 2. No foreign body.  3. No acute osseous

## 2023-12-08 NOTE — FLOWSHEET NOTE
Per PCA, pt refusing to stand up for weight and/or bed weight. Pt has refused twice and was being belligerent with PCA . Pt states that getting her weight is \"irrelevant! \"

## 2023-12-08 NOTE — PROGRESS NOTES
Lab called with critical vanc trough of 30.6. MD made aware.      Electronically signed by Jewell Bamberger, RN on 12/8/2023 at 9:17 AM

## 2023-12-08 NOTE — PLAN OF CARE
Problem: Safety - Adult  Goal: Free from fall injury  12/7/2023 2144 by Andre Calles RN  Outcome: Progressing  12/7/2023 1711 by Delores Smith RN  Outcome: Progressing     Problem: Discharge Planning  Goal: Discharge to home or other facility with appropriate resources  12/7/2023 2144 by Andre Calles RN  Outcome: Progressing  12/7/2023 1711 by Delores Smith RN  Outcome: Progressing     Problem: Pain  Goal: Verbalizes/displays adequate comfort level or baseline comfort level  12/7/2023 2144 by Andre Calles RN  Outcome: Progressing  12/7/2023 1711 by Delores Smith RN  Outcome: Progressing     Problem: Skin/Tissue Integrity - Adult  Goal: Incisions, wounds, or drain sites healing without S/S of infection  12/7/2023 2144 by Andre Calles RN  Outcome: Progressing  12/7/2023 1711 by Delores Smith RN  Outcome: Progressing     Problem: Infection - Adult  Goal: Absence of infection at discharge  12/7/2023 2144 by Jerry Plunkett RN  Outcome: Progressing  12/7/2023 1711 by Delores Smith RN  Outcome: Progressing  Goal: Absence of infection during hospitalization  12/7/2023 2144 by Jerry Plunkett RN  Outcome: Progressing  12/7/2023 1711 by Delores Smith RN  Outcome: Progressing

## 2023-12-08 NOTE — PLAN OF CARE
Problem: Safety - Adult  Goal: Free from fall injury  12/8/2023 1118 by Krystin Kraft RN  Outcome: Progressing  12/7/2023 2145 by Chen Schroeder RN  Outcome: Progressing  12/7/2023 2144 by Emir Mccauley, RN  Outcome: Progressing     Problem: Discharge Planning  Goal: Discharge to home or other facility with appropriate resources  12/8/2023 1118 by Krystin Kraft RN  Outcome: Progressing  12/7/2023 2145 by Chen Schroeder, RN  Outcome: Progressing  12/7/2023 2144 by Chen Schroeder RN  Outcome: Progressing     Problem: Pain  Goal: Verbalizes/displays adequate comfort level or baseline comfort level  12/8/2023 1118 by Krystin Kraft RN  Outcome: Progressing  12/7/2023 2145 by Chen Schroeder, RN  Outcome: Progressing  12/7/2023 2144 by Chen Schroeder RN  Outcome: Progressing     Problem: Skin/Tissue Integrity - Adult  Goal: Incisions, wounds, or drain sites healing without S/S of infection  12/8/2023 1118 by Krystin Kraft RN  Outcome: Progressing  12/7/2023 2145 by Chen Schroeder, RN  Outcome: Progressing  12/7/2023 2144 by Chen Schroeder, RN  Outcome: Progressing     Problem: Infection - Adult  Goal: Absence of infection at discharge  12/8/2023 1118 by Krystin Kraft RN  Outcome: Progressing  12/7/2023 2145 by Chen Schroeder, RN  Outcome: Progressing  12/7/2023 2144 by Chen Schroedre, RN  Outcome: Progressing  Goal: Absence of infection during hospitalization  12/8/2023 1118 by Krystin Kraft RN  Outcome: Progressing  12/7/2023 2145 by Emir Mccauley RN  Outcome: Progressing  12/7/2023 2144 by Emir Mccauley, RN  Outcome: Progressing

## 2023-12-08 NOTE — PLAN OF CARE
Problem: Discharge Planning  Goal: Discharge to home or other facility with appropriate resources  12/7/2023 2145 by Rodrigo Davis, RN  Outcome: Progressing  12/7/2023 2144 by Rodrigo Davis, RN  Outcome: Progressing  12/7/2023 1711 by Dennys Han RN  Outcome: Progressing     Problem: Pain  Goal: Verbalizes/displays adequate comfort level or baseline comfort level  12/7/2023 2145 by Rodrigo Davis, RN  Outcome: Progressing  12/7/2023 2144 by Rodrigo Davis, RN  Outcome: Progressing  12/7/2023 1711 by Dennys Han RN  Outcome: Progressing     Problem: Skin/Tissue Integrity - Adult  Goal: Incisions, wounds, or drain sites healing without S/S of infection  12/7/2023 2145 by Rodrigo Davis, RN  Outcome: Progressing  12/7/2023 2144 by Rodrigo Davis, RN  Outcome: Progressing  12/7/2023 1711 by Dennys Han RN  Outcome: Progressing     Problem: Infection - Adult  Goal: Absence of infection at discharge  12/7/2023 2145 by Meliton Marti RN  Outcome: Progressing  12/7/2023 2144 by Rodrigo Davis, RN  Outcome: Progressing  12/7/2023 1711 by Dennys Han RN  Outcome: Progressing  Goal: Absence of infection during hospitalization  12/7/2023 2145 by Rodrigo Davis, RN  Outcome: Progressing  12/7/2023 2144 by Rodrigo Davis RN  Outcome: Progressing  12/7/2023 1711 by Dennys Han RN  Outcome: Progressing

## 2023-12-09 NOTE — PROGRESS NOTES
This Charge RN @ bedside. Patient anxious and tearful. Wanting to leave AMA because she can't go outside and smoke a cigarette. Patient offered a nicotine patch. Patient refused. Patient verbalized understanding. AMA explained to patient. Patient verbalized understanding. Mary Duvall, NP made aware. Nursing supervisor made aware. Patient has PICC line in place. Will remove PICC. Patient states her wife will pick her up and she has already called her.

## 2023-12-09 NOTE — PROGRESS NOTES
Pt threatening to leave AMA with PICC. Pt states that Hermilo Alvarenga is a hard stick and that she is coming back through the ED tomorrow anyway. \" Pt with charge nurse currently. Charge nurse pulling PICC. Called security. Security @ bedside.

## 2023-12-09 NOTE — PROGRESS NOTES
PICC line removed w/o complication. No bleeding noted. Pt not willing to wait for me to hold pressure for 5 min. Pt states she will hold pressure. Site wrapped tight w [employer] gauze and kerlix. Pt instructed to leave in placed for 5 min. Pt verbalized understanding. This RN and security walked with pt and all belongings to elevator. Security escorted pt to main entrance.

## 2023-12-09 NOTE — PROGRESS NOTES
Pt requesting to go downstairs to smoke. Educated pt hospital policy that she can't leave to smoke. Pt refusing nicotine patch. Pt A/Ox4. Pt states she wants to leave AMA and that she wants her PICC removed in 5 minutes.

## 2023-12-10 LAB
BACTERIA BLD CULT ORG #2: NORMAL
BACTERIA BLD CULT: NORMAL